# Patient Record
Sex: FEMALE | Race: WHITE | NOT HISPANIC OR LATINO | URBAN - METROPOLITAN AREA
[De-identification: names, ages, dates, MRNs, and addresses within clinical notes are randomized per-mention and may not be internally consistent; named-entity substitution may affect disease eponyms.]

---

## 2023-08-10 ENCOUNTER — HOSPITAL ENCOUNTER (OUTPATIENT)
Dept: DATA CONVERSION | Facility: HOSPITAL | Age: 48
Discharge: HOME | End: 2023-08-10

## 2023-08-10 DIAGNOSIS — R19.5 OTHER FECAL ABNORMALITIES: ICD-10-CM

## 2023-08-14 LAB — CALPROTECTIN STL-MCNT: NORMAL UG/G

## 2023-11-21 ENCOUNTER — LAB (OUTPATIENT)
Dept: LAB | Facility: LAB | Age: 48
End: 2023-11-21
Payer: COMMERCIAL

## 2023-11-21 DIAGNOSIS — R10.84 GENERALIZED ABDOMINAL PAIN: ICD-10-CM

## 2023-11-21 DIAGNOSIS — Z00.00 ENCOUNTER FOR GENERAL ADULT MEDICAL EXAMINATION WITHOUT ABNORMAL FINDINGS: ICD-10-CM

## 2023-11-21 DIAGNOSIS — E55.9 VITAMIN D DEFICIENCY, UNSPECIFIED: Primary | ICD-10-CM

## 2023-11-21 DIAGNOSIS — E03.9 HYPOTHYROIDISM, UNSPECIFIED: ICD-10-CM

## 2023-11-21 LAB
25(OH)D3 SERPL-MCNC: 18 NG/ML (ref 30–100)
ALBUMIN SERPL BCP-MCNC: 4.7 G/DL (ref 3.4–5)
ALP SERPL-CCNC: 116 U/L (ref 33–110)
ALT SERPL W P-5'-P-CCNC: 97 U/L (ref 7–45)
ANION GAP SERPL CALC-SCNC: 12 MMOL/L (ref 10–20)
AST SERPL W P-5'-P-CCNC: 48 U/L (ref 9–39)
BASOPHILS # BLD AUTO: 0.05 X10*3/UL (ref 0–0.1)
BASOPHILS NFR BLD AUTO: 1.1 %
BILIRUB SERPL-MCNC: 0.6 MG/DL (ref 0–1.2)
BUN SERPL-MCNC: 17 MG/DL (ref 6–23)
CALCIUM SERPL-MCNC: 9.7 MG/DL (ref 8.6–10.3)
CHLORIDE SERPL-SCNC: 102 MMOL/L (ref 98–107)
CHOLEST SERPL-MCNC: 188 MG/DL (ref 0–199)
CHOLESTEROL/HDL RATIO: 3.1
CO2 SERPL-SCNC: 29 MMOL/L (ref 21–32)
CREAT SERPL-MCNC: 0.78 MG/DL (ref 0.5–1.05)
CRP SERPL-MCNC: 0.24 MG/DL
EOSINOPHIL # BLD AUTO: 0.16 X10*3/UL (ref 0–0.7)
EOSINOPHIL NFR BLD AUTO: 3.5 %
ERYTHROCYTE [DISTWIDTH] IN BLOOD BY AUTOMATED COUNT: 12.3 % (ref 11.5–14.5)
ERYTHROCYTE [SEDIMENTATION RATE] IN BLOOD BY WESTERGREN METHOD: 10 MM/H (ref 0–20)
GFR SERPL CREATININE-BSD FRML MDRD: >90 ML/MIN/1.73M*2
GLUCOSE SERPL-MCNC: 86 MG/DL (ref 74–99)
HCT VFR BLD AUTO: 42.6 % (ref 36–46)
HDLC SERPL-MCNC: 60.8 MG/DL
HGB BLD-MCNC: 13.4 G/DL (ref 12–16)
IMM GRANULOCYTES # BLD AUTO: 0.01 X10*3/UL (ref 0–0.7)
IMM GRANULOCYTES NFR BLD AUTO: 0.2 % (ref 0–0.9)
LDLC SERPL CALC-MCNC: 112 MG/DL
LYMPHOCYTES # BLD AUTO: 1.55 X10*3/UL (ref 1.2–4.8)
LYMPHOCYTES NFR BLD AUTO: 34 %
MAGNESIUM SERPL-MCNC: 2.26 MG/DL (ref 1.6–2.4)
MCH RBC QN AUTO: 28.9 PG (ref 26–34)
MCHC RBC AUTO-ENTMCNC: 31.5 G/DL (ref 32–36)
MCV RBC AUTO: 92 FL (ref 80–100)
MONOCYTES # BLD AUTO: 0.32 X10*3/UL (ref 0.1–1)
MONOCYTES NFR BLD AUTO: 7 %
NEUTROPHILS # BLD AUTO: 2.47 X10*3/UL (ref 1.2–7.7)
NEUTROPHILS NFR BLD AUTO: 54.2 %
NON HDL CHOLESTEROL: 127 MG/DL (ref 0–149)
NRBC BLD-RTO: 0 /100 WBCS (ref 0–0)
PLATELET # BLD AUTO: 202 X10*3/UL (ref 150–450)
POTASSIUM SERPL-SCNC: 4.4 MMOL/L (ref 3.5–5.3)
PROT SERPL-MCNC: 7.5 G/DL (ref 6.4–8.2)
RBC # BLD AUTO: 4.64 X10*6/UL (ref 4–5.2)
SODIUM SERPL-SCNC: 139 MMOL/L (ref 136–145)
TRIGL SERPL-MCNC: 75 MG/DL (ref 0–149)
TSH SERPL-ACNC: 1.52 MIU/L (ref 0.44–3.98)
VLDL: 15 MG/DL (ref 0–40)
WBC # BLD AUTO: 4.6 X10*3/UL (ref 4.4–11.3)

## 2023-11-21 PROCEDURE — 84443 ASSAY THYROID STIM HORMONE: CPT

## 2023-11-21 PROCEDURE — 36415 COLL VENOUS BLD VENIPUNCTURE: CPT

## 2023-11-21 PROCEDURE — 85652 RBC SED RATE AUTOMATED: CPT

## 2023-11-21 PROCEDURE — 82306 VITAMIN D 25 HYDROXY: CPT

## 2023-11-21 PROCEDURE — 80061 LIPID PANEL: CPT

## 2023-11-21 PROCEDURE — 85025 COMPLETE CBC W/AUTO DIFF WBC: CPT

## 2023-11-21 PROCEDURE — 86140 C-REACTIVE PROTEIN: CPT

## 2023-11-21 PROCEDURE — 83735 ASSAY OF MAGNESIUM: CPT

## 2023-11-21 PROCEDURE — 80053 COMPREHEN METABOLIC PANEL: CPT

## 2023-11-22 ENCOUNTER — OFFICE VISIT (OUTPATIENT)
Dept: OTOLARYNGOLOGY | Facility: CLINIC | Age: 48
End: 2023-11-22
Payer: COMMERCIAL

## 2023-11-22 ENCOUNTER — CLINICAL SUPPORT (OUTPATIENT)
Dept: AUDIOLOGY | Facility: CLINIC | Age: 48
End: 2023-11-22
Payer: COMMERCIAL

## 2023-11-22 VITALS — TEMPERATURE: 97.5 F | BODY MASS INDEX: 25.95 KG/M2 | WEIGHT: 152 LBS | HEIGHT: 64 IN

## 2023-11-22 DIAGNOSIS — H90.3 ASYMMETRICAL SENSORINEURAL HEARING LOSS: ICD-10-CM

## 2023-11-22 DIAGNOSIS — H93.12 TINNITUS OF LEFT EAR: Primary | ICD-10-CM

## 2023-11-22 DIAGNOSIS — H90.3 SENSORINEURAL HEARING LOSS (SNHL) OF BOTH EARS: Primary | ICD-10-CM

## 2023-11-22 PROCEDURE — 92550 TYMPANOMETRY & REFLEX THRESH: CPT | Performed by: AUDIOLOGIST

## 2023-11-22 PROCEDURE — 92557 COMPREHENSIVE HEARING TEST: CPT | Performed by: AUDIOLOGIST

## 2023-11-22 PROCEDURE — 99213 OFFICE O/P EST LOW 20 MIN: CPT | Performed by: OTOLARYNGOLOGY

## 2023-11-22 PROCEDURE — 1036F TOBACCO NON-USER: CPT | Performed by: OTOLARYNGOLOGY

## 2023-11-22 RX ORDER — LEVOTHYROXINE SODIUM 112 UG/1
112 CAPSULE ORAL DAILY
COMMUNITY
End: 2024-03-15 | Stop reason: WASHOUT

## 2023-11-22 NOTE — PROGRESS NOTES
"HPI  Camilo Loo is a 47 y.o. female here to follow-up on left greater than right tinnitus and sensorineural hearing loss.  Negative MRI in 2021.  Audiogram today is stable in comparison to 2021 with left high-frequency asymmetry.  Normal tympanometry.  She had some worsening tinnitus bilaterally recently with COVID but this seems to have resolved.  Nevertheless she kept the appointment for follow-up      Past Medical History:   Diagnosis Date    Personal history of other diseases of the respiratory system 03/28/2016    History of sore throat    Personal history of other endocrine, nutritional and metabolic disease     History of hypothyroidism            Medications:     Current Outpatient Medications:     levothyroxine (Tirosint) 112 mcg capsule, Take 1 capsule (112 mcg) by mouth once daily., Disp: , Rfl:      Allergies:  No Known Allergies     Physical Exam:  Last Recorded Vitals  Temperature 36.4 °C (97.5 °F), height 1.626 m (5' 4\"), weight 68.9 kg (152 lb).  General:     General appearance: Well-developed, well-nourished in no acute distress.       Voice:  normal       Head/face: Normal appearance; nontender to palpation     Facial nerve function: Normal and symmetric bilaterally.    Oral/oropharynx:     Oral vestibule: Normal labial and gingival mucosa     Tongue/floor of mouth: Normal without lesion     Oropharynx: Clear.  No lesions present of the hard/soft palate, posterior pharynx    Neck:     Neck: Normal appearance, trachea midline     Salivary glands: Normal to palpation bilaterally     Lymph nodes: No cervical lymphadenopathy to palpation     Thyroid: No thyromegaly.  No palpable nodules     Range of motion: Normal    Neurological:     Cortical functions: Alert and oriented x3, appropriate affect       Larynx/hypopharynx:     Laryngeal findings: Mirror exam inadequate or limited secondary to enlarged base of tongue and/or excessive gagging    Ear:     Ear canal: Normal bilaterally     Tympanic " membrane: Intact and mobile bilaterally     Pinna: Normal bilaterally     Hearing:  Gross hearing assessment normal by voice    Nose:     Visualized using: Anterior rhinoscopy     Nasopharynx: Inadequate mirror exam secondary to gag, anatomy.       Nasal dorsum: Nontraumatic midline appearance     Septum: Midline     Inferior turbinates: Normally sized     Mucosa: Bilateral, pink, normal appearing       Assessment/Plan   Reassured no change in audiogram.  She is feeling better again.  Back to her baseline left tinnitus.  Tinnitus protocol reviewed.  Masking reviewed.  Her mental health is good.  We will recheck the audiogram in a couple years, sooner as needed with change         Lalito Wright MD

## 2023-11-30 ENCOUNTER — HOSPITAL ENCOUNTER (OUTPATIENT)
Dept: RADIOLOGY | Facility: HOSPITAL | Age: 48
Discharge: HOME | End: 2023-11-30
Payer: COMMERCIAL

## 2023-11-30 DIAGNOSIS — R79.89 OTHER SPECIFIED ABNORMAL FINDINGS OF BLOOD CHEMISTRY: ICD-10-CM

## 2023-11-30 DIAGNOSIS — R10.2 PELVIC AND PERINEAL PAIN: ICD-10-CM

## 2023-11-30 PROCEDURE — 76700 US EXAM ABDOM COMPLETE: CPT

## 2023-11-30 PROCEDURE — 76830 TRANSVAGINAL US NON-OB: CPT

## 2023-12-26 ENCOUNTER — HOSPITAL ENCOUNTER (OUTPATIENT)
Dept: RADIOLOGY | Facility: HOSPITAL | Age: 48
Discharge: HOME | End: 2023-12-26
Payer: COMMERCIAL

## 2023-12-26 DIAGNOSIS — Z13.6 ENCOUNTER FOR SCREENING FOR CARDIOVASCULAR DISORDERS: ICD-10-CM

## 2023-12-26 PROCEDURE — 75571 CT HRT W/O DYE W/CA TEST: CPT

## 2024-01-11 ENCOUNTER — HOSPITAL ENCOUNTER (OUTPATIENT)
Dept: RADIOLOGY | Facility: HOSPITAL | Age: 49
Discharge: HOME | End: 2024-01-11
Payer: COMMERCIAL

## 2024-01-11 DIAGNOSIS — M54.50 LOW BACK PAIN, UNSPECIFIED: ICD-10-CM

## 2024-01-11 DIAGNOSIS — G89.29 OTHER CHRONIC PAIN: ICD-10-CM

## 2024-01-11 DIAGNOSIS — R10.84 GENERALIZED ABDOMINAL PAIN: ICD-10-CM

## 2024-01-11 DIAGNOSIS — R10.9 UNSPECIFIED ABDOMINAL PAIN: ICD-10-CM

## 2024-01-11 PROCEDURE — 2550000001 HC RX 255 CONTRASTS

## 2024-01-11 PROCEDURE — 74177 CT ABD & PELVIS W/CONTRAST: CPT

## 2024-01-11 RX ADMIN — IOHEXOL 75 ML: 350 INJECTION, SOLUTION INTRAVENOUS at 09:14

## 2024-01-18 ENCOUNTER — LAB (OUTPATIENT)
Dept: LAB | Facility: LAB | Age: 49
End: 2024-01-18
Payer: COMMERCIAL

## 2024-01-18 DIAGNOSIS — M54.6 PAIN IN THORACIC SPINE: Primary | ICD-10-CM

## 2024-01-18 LAB — HOLD SPECIMEN: NORMAL

## 2024-01-18 PROCEDURE — 36415 COLL VENOUS BLD VENIPUNCTURE: CPT

## 2024-01-18 PROCEDURE — 86235 NUCLEAR ANTIGEN ANTIBODY: CPT

## 2024-01-18 PROCEDURE — 86225 DNA ANTIBODY NATIVE: CPT

## 2024-01-18 PROCEDURE — 86038 ANTINUCLEAR ANTIBODIES: CPT

## 2024-01-19 LAB
ANA PATTERN: ABNORMAL
ANA SER QL HEP2 SUBST: POSITIVE
ANA TITR SER IF: ABNORMAL {TITER}
CENTROMERE B AB SER-ACNC: <0.2 AI
CHROMATIN AB SERPL-ACNC: <0.2 AI
DSDNA AB SER-ACNC: <1 IU/ML
ENA JO1 AB SER QL IA: <0.2 AI
ENA RNP AB SER IA-ACNC: 2.2 AI
ENA SCL70 AB SER QL IA: <0.2 AI
ENA SM AB SER IA-ACNC: <0.2 AI
ENA SM+RNP AB SER QL IA: <0.2 AI
ENA SS-A AB SER IA-ACNC: <0.2 AI
ENA SS-B AB SER IA-ACNC: <0.2 AI
RIBOSOMAL P AB SER-ACNC: <0.2 AI

## 2024-02-21 ENCOUNTER — HOSPITAL ENCOUNTER (OUTPATIENT)
Dept: RADIOLOGY | Facility: HOSPITAL | Age: 49
Discharge: HOME | End: 2024-02-21
Payer: COMMERCIAL

## 2024-02-21 DIAGNOSIS — M54.6 PAIN IN THORACIC SPINE: ICD-10-CM

## 2024-02-21 DIAGNOSIS — G89.29 OTHER CHRONIC PAIN: ICD-10-CM

## 2024-02-21 PROCEDURE — 72128 CT CHEST SPINE W/O DYE: CPT

## 2024-02-27 ENCOUNTER — APPOINTMENT (OUTPATIENT)
Dept: RADIOLOGY | Facility: HOSPITAL | Age: 49
End: 2024-02-27
Payer: COMMERCIAL

## 2024-03-09 ENCOUNTER — LAB (OUTPATIENT)
Dept: LAB | Facility: LAB | Age: 49
End: 2024-03-09
Payer: COMMERCIAL

## 2024-03-09 DIAGNOSIS — I10 ESSENTIAL (PRIMARY) HYPERTENSION: Primary | ICD-10-CM

## 2024-03-09 LAB
ALBUMIN SERPL-MCNC: 4.4 G/DL (ref 3.5–5)
ALP BLD-CCNC: 131 U/L (ref 35–125)
ALT SERPL-CCNC: 98 U/L (ref 5–40)
ANION GAP SERPL CALC-SCNC: 11 MMOL/L
AST SERPL-CCNC: 53 U/L (ref 5–40)
BILIRUB SERPL-MCNC: 0.5 MG/DL (ref 0.1–1.2)
BUN SERPL-MCNC: 15 MG/DL (ref 8–25)
CALCIUM SERPL-MCNC: 9.4 MG/DL (ref 8.5–10.4)
CHLORIDE SERPL-SCNC: 103 MMOL/L (ref 97–107)
CO2 SERPL-SCNC: 28 MMOL/L (ref 24–31)
CREAT SERPL-MCNC: 0.9 MG/DL (ref 0.4–1.6)
EGFRCR SERPLBLD CKD-EPI 2021: 79 ML/MIN/1.73M*2
GLUCOSE SERPL-MCNC: 90 MG/DL (ref 65–99)
POTASSIUM SERPL-SCNC: 4.8 MMOL/L (ref 3.4–5.1)
PROT SERPL-MCNC: 7.1 G/DL (ref 5.9–7.9)
SODIUM SERPL-SCNC: 142 MMOL/L (ref 133–145)

## 2024-03-09 PROCEDURE — 80053 COMPREHEN METABOLIC PANEL: CPT

## 2024-03-09 PROCEDURE — 36415 COLL VENOUS BLD VENIPUNCTURE: CPT

## 2024-03-11 ENCOUNTER — HOSPITAL ENCOUNTER (OUTPATIENT)
Dept: RADIOLOGY | Facility: HOSPITAL | Age: 49
Discharge: HOME | End: 2024-03-11
Payer: COMMERCIAL

## 2024-03-11 DIAGNOSIS — M54.50 LOW BACK PAIN, UNSPECIFIED: ICD-10-CM

## 2024-03-11 DIAGNOSIS — M54.9 DORSALGIA, UNSPECIFIED: ICD-10-CM

## 2024-03-11 DIAGNOSIS — M54.6 PAIN IN THORACIC SPINE: ICD-10-CM

## 2024-03-11 PROCEDURE — 72141 MRI NECK SPINE W/O DYE: CPT | Performed by: RADIOLOGY

## 2024-03-11 PROCEDURE — 72146 MRI CHEST SPINE W/O DYE: CPT

## 2024-03-11 PROCEDURE — 72146 MRI CHEST SPINE W/O DYE: CPT | Performed by: RADIOLOGY

## 2024-03-11 PROCEDURE — 72141 MRI NECK SPINE W/O DYE: CPT

## 2024-03-11 PROCEDURE — 72148 MRI LUMBAR SPINE W/O DYE: CPT

## 2024-03-11 PROCEDURE — 72148 MRI LUMBAR SPINE W/O DYE: CPT | Performed by: RADIOLOGY

## 2024-03-14 NOTE — PROGRESS NOTES
Hepatology: Initial Office Note     Patient: Camilo Loo, a 48 y.o. year old female presents for evaluation of elevated liver enzymes.  PCP: Cm Rice MD    History of Present Illness   Camilo Loo presents for evaluation of elevated liver enzymes.    Noted to have elevation in liver enzymes for the last 3-4 years. More recently in Nov 2023 was noted to have worsening liver enzymes. Denies prior diagnosis of chronic liver disease or cirrhosis.    Denies symptoms of right upper quadrant abdominal pain, nausea, vomiting, jaundice, abdominal distention, confusion.   Denies fever, chills, rash, altered bowel habits.     She has been dealing with chronic back pain, joint pain and neck pain. Was noted to have a positive LAURENCE test and is planned to see rheumatology this month. Denies use of herbal supplements. Started taking meloxicam 2-3 weeks ago, once a day.     Notes use of 1-2 drinks of whiteclaw while making dinner in the past, has abstained from alcohol use (had a glass of wine over the holidays) for 3 months.      Review of Systems   Constitutional/ General: No fever, no night sweats, no significant changes in weight   Eyes: no yellow discoloration  ENT: normal   Cardiovascular: no chest pain, no palpitations  Respiratory: no shortness of breath  Gastrointestinal: denies abdominal pain, nausea, vomiting  Integumentary: no rashes, no yellow discoloration of skin  Neurologic: no weakness or numbness of extremities  Psychiatric: no mood fluctuations  Musculoskeletal: back pain  Genitourinary: no dysuria, no hematuria    All other systems have been reviewed and are negative except as noted in the HPI and above.    PMHx: Hypothyroidism (since the age of 11 yrs, no dose change), HTN, chronic back pain, Vit D deficiency  PSHx: Hysterectomy  Social hx: 1-2 drinks of alcohol on a regular basis up until 3 months ago, prior hx of smoking, denies hx of illicit substance use.   Family hx: denies history of  hepatobiliary disease or malignancy in the family. Maternal aunt with hx of hypothyroidism. Maternal uncle with hx of elevated homocysteine levels     Medications     Current Outpatient Medications   Medication Instructions    levothyroxine (TIROSINT) 112 mcg, oral, Daily         Physical Examination     Vitals:    03/15/24 1511   BP: 144/81   Pulse: 69   Resp: 18   SpO2: 98%     Vitals:    03/15/24 1511   Weight: 68 kg (149 lb 14.4 oz)     Body mass index is 26.98 kg/m².    Constitutional: awake, alert   Eyes: EOMI, anicteric sclera  ENT: no oropharyngeal lesions visualized  Respiratory: Bilateral air entry equal no wheezing  Cardiovascular: regular rate and rhythm, no lower extremity edema  Abdomen: soft, non tender, non distended, bowel sounds present  Integumentary: no wounds on examined skin   Musculoskeletal: no joint swelling   Neurologic: gross motor strength intact   Psychiatric: alert, appropriate mood and affect, oriented to time/place/person    Labs     Lab Results   Component Value Date     03/09/2024    K 4.8 03/09/2024    CREATININE 0.90 03/09/2024    ALBUMIN 4.4 03/09/2024    ALT 98 (H) 03/09/2024    AST 53 (H) 03/09/2024    ALKPHOS 131 (H) 03/09/2024    HGB 13.4 11/21/2023     11/21/2023      Lab Results   Component Value Date    ALT 98 (H) 03/09/2024    ALT 97 (H) 11/21/2023    ALT 42 (H) 02/10/2023    ALT 22 02/03/2023    ALT 83 (H) 11/19/2021    AST 53 (H) 03/09/2024    AST 48 (H) 11/21/2023    AST 16 02/10/2023    AST 20 02/03/2023    AST 45 (H) 11/19/2021    ALKPHOS 131 (H) 03/09/2024    ALKPHOS 116 (H) 11/21/2023    ALKPHOS 90 02/10/2023    ALKPHOS 84 02/03/2023    ALKPHOS 108 11/19/2021    BILITOT 0.5 03/09/2024    LAURENCE Positive (A) 01/18/2024    ANATITER 1:80 01/18/2024     Imaging   CTAP Jan 2024: Liver is unremarkable.   US abdomen Nov 2023: FINDINGS: Liver: Normal echogenicity without focal lesion. Gallbladder: Normal.  No gallstones, wall thickening, or pericholecystic fluid.  Biliary tree: There is no intrahepatic biliary distention. CBD measures 1.8 - 2.4 mm diameter. No choledocholithiasis.    Assessment and Plan    Camilo Loo, a 48 y.o. year old female presents for evaluation of elevated liver enzymes. I have reviewed pertinent provider notes, labs and imaging studies. Discussed results and their interpretation with the patient today.    Encounter Diagnosis   Name Primary?    Elevated liver enzymes Yes     Orders Placed This Encounter   Procedures    Alpha-1 Antitrypsin Phenotype    LAURENCE with Reflex to ERIBERTO    Anti-Mitochondrial Antibody    Anti-Smooth Muscle Antibody    Ceruloplasmin    Ferritin    Hepatic Function Panel    Hepatitis A Antibody, Total    Hepatitis B Core Antibody, Total    Hepatitis B Surface Antibody    Hepatitis B Surface Antigen    Hepatitis C Antibody    Immunoglobulins (IgG, IgA, IgM)    Iron and TIBC    Liver/Kidney Microsome Type 1 Antibodies, Serum    Soluble Liver Ag Abs    Tissue Transglutaminase IgA    CBC and Auto Differential    Protime-INR      # Chronic elevations of ALT/AST, mixed pattern of liver enzyme elevations in the past year  Worsening ALT/AST elevations in the last 3-4 months. Noted to have an elevated LAURENCE of 1:80 titer. Noted to have regular mild to modest alcohol use- but LFT elevations are not typical for alcohol related injury of the liver. No steatosis noted on imaging.     Recommend further investigation for etiologies of chronic liver ds which can result in elevated LFTs. This includes evaluation for chronic hepatitis B, C; autoimmune hepatitis, A1AT deficiency, screening for other genetic and metabolic ds of the liver. In addition, recommend check of hepatitis A and B serologies to determine prior exposure or immunity. Discussed work up for elevated liver enzymes with the pt. Pending labs work up, will review further recommendations including need for a liver bx. Avoidance of hepatotoxic agents, including alcohol and NSAIDs.      Follow up visit in 3 months.

## 2024-03-15 ENCOUNTER — OFFICE VISIT (OUTPATIENT)
Dept: GASTROENTEROLOGY | Facility: CLINIC | Age: 49
End: 2024-03-15
Payer: COMMERCIAL

## 2024-03-15 VITALS
HEART RATE: 69 BPM | RESPIRATION RATE: 18 BRPM | BODY MASS INDEX: 26.56 KG/M2 | SYSTOLIC BLOOD PRESSURE: 144 MMHG | DIASTOLIC BLOOD PRESSURE: 81 MMHG | HEIGHT: 63 IN | OXYGEN SATURATION: 98 % | WEIGHT: 149.9 LBS

## 2024-03-15 DIAGNOSIS — R74.8 ELEVATED LIVER ENZYMES: Primary | ICD-10-CM

## 2024-03-15 PROCEDURE — 1036F TOBACCO NON-USER: CPT | Performed by: STUDENT IN AN ORGANIZED HEALTH CARE EDUCATION/TRAINING PROGRAM

## 2024-03-15 PROCEDURE — 99204 OFFICE O/P NEW MOD 45 MIN: CPT | Performed by: STUDENT IN AN ORGANIZED HEALTH CARE EDUCATION/TRAINING PROGRAM

## 2024-03-15 RX ORDER — LEVOTHYROXINE SODIUM 112 UG/1
TABLET ORAL
COMMUNITY

## 2024-03-15 RX ORDER — MELOXICAM 15 MG/1
15 TABLET ORAL
COMMUNITY
Start: 2024-02-22 | End: 2024-03-23

## 2024-03-15 RX ORDER — DULOXETIN HYDROCHLORIDE 30 MG/1
30 CAPSULE, DELAYED RELEASE ORAL
COMMUNITY

## 2024-03-15 RX ORDER — METRONIDAZOLE 7.5 MG/G
CREAM TOPICAL
COMMUNITY
Start: 2023-04-17

## 2024-03-15 ASSESSMENT — PAIN SCALES - GENERAL: PAINLEVEL: 4

## 2024-03-15 NOTE — PATIENT INSTRUCTIONS
Camilo Loo,     Thank you for coming in for your hepatology office visit today. As per our discussion, I recommend:     Have labs done for evaluation of the liver.   Continue to avoid alcohol use. Avoid regular use of NSAIDs.     I would like to see you back in the office in 3 months.   If you are not provided with a date for your follow up visit at the end of your encounter today at the check out desk, I would encourage you to call [phone] to schedule your appointment with me.   If you have any trouble or need assistance with having this done, please reach out to my 's office at [phone] (Ms Amisha Gold) or my nurse coordinator at [phone] (Ms Didi ANDERSON).     I look forward to seeing you again. Thank you for allowing me to participate in your care.     Sincerely,  Rachelle Rdz MD  Hepatology

## 2024-03-25 ENCOUNTER — OFFICE VISIT (OUTPATIENT)
Dept: RHEUMATOLOGY | Facility: CLINIC | Age: 49
End: 2024-03-25
Payer: COMMERCIAL

## 2024-03-25 VITALS — WEIGHT: 149 LBS | BODY MASS INDEX: 26.82 KG/M2 | SYSTOLIC BLOOD PRESSURE: 120 MMHG | DIASTOLIC BLOOD PRESSURE: 72 MMHG

## 2024-03-25 DIAGNOSIS — R74.8 ELEVATED LIVER ENZYMES: ICD-10-CM

## 2024-03-25 DIAGNOSIS — R76.8 ANA POSITIVE: Primary | ICD-10-CM

## 2024-03-25 DIAGNOSIS — G89.29 OTHER CHRONIC PAIN: ICD-10-CM

## 2024-03-25 PROCEDURE — 99215 OFFICE O/P EST HI 40 MIN: CPT | Performed by: INTERNAL MEDICINE

## 2024-03-25 PROCEDURE — 1036F TOBACCO NON-USER: CPT | Performed by: INTERNAL MEDICINE

## 2024-03-25 NOTE — PROGRESS NOTES
"Ref per Dr. Rdz for evaluation and treatment of + LAURENCE.  Per patient labs checked due to elevated liver function tests.  CT scan x 3 with normal results.  Recommend eval with Rheum for possible causes.    Per patient shows infection and inflammation.  \" Dr. Harper tells me where the infections are. \"   MRI spine with buldging discs as well as ? Mass effect thoracic spine.  Eval with Pain Management with recommendation of Duloxetine daily.  Pending nerve and muscle testing.       She is here with +LAURENCE.  She had stomach pain last yr, and she also has back pain.  She said that she had mult CT scans that showed \"infection\" - although the most recent CT scans have been nl.  She states that her primary told her that this might be autoimmune - but again, I don't see an LAURENCE from last yr.  She has LBP 1-2 yrs, lower right back pain started 1 yr ago, and she thought she had a UTI, so her gyn had her take abx and then saw her in person who \"thought she felt something\" so her primary had her get the CT.  She had CP and abd pain and couldn't eat because it felt like the food was \"pushing on out\" so she went to ER and had CT that showed \"infection\" and put her on abx.  She saw GI - no note I can find, and they put her on \"something like a pepcid\"  She had \"massive muscle aches\" which she has had for 1 yr.  \"My thoracic pain comes and goes\" \"it's evolved into a contant feeling like there is something there\"  She has \"always had a problem with it for 2 or 3 yrs where I have a problem turning it left or right\"  She feels like she has swollen glands, and she has to hold her neck because it is so painful.  She occ feels like she can't get food to go down.  Pain mgmt told her that this could be d/t c-spine issues\"  She has tinnitus in her L ear x 3 yrs and has seen ENT who did MRI that was unremarkable.  She gets pain in her ears, and her head feels \"clogged all the time\" \"I get dizzy, I feel unfocused, I feel exhausted all the time\"  " "She has ST at the base of her neck.  \"I don't know if I'm having flare ups in different parts of my body\"  She was started on duloxetine x 1.5 wks, and she has noticed the pain is improving.  She had covid 9/23 and 12/23.    Her L knee, L leg, and L arm hurt.   No swelling.  She feels worst in AM.   She has L arm numbness on and off for \"a couple years\" - has never seen neuro.   She has HA \"my whole life\" \"pretty routinely\" - she hasn't seen neuro about this.   No fever.  +sicca - \"I would say yeah - my eyes have been itchy a few wks\"  No mouth sores or raynauds.  She gets intermittent rashes on and off x 2 yrs - itchy and can last a few months - saw derm \"it's been awhile\" Said most recently she saw an allergist who gave her a cream, which helped.   She has been dx with rosacea on her nose.   No recent CP.  No resp.  Has alt diarrhea/constipation    FH - +arthritis.  No CTD  SH - former smoker.  No recent EtOH - did drink 2 a few x/wk prior.   No drugs    PE  Gen - NAD  HEENT - moist MM nl salivary pooling (chewing gum)  Neck - supple, no LAD.  I do not feel any swollen LN  CV - RRR no r/m/g  Lungs - CTA  Abd - +BS  Extr - 2+ DP, PT, and rad pulses.  No c/c/e  Skin - no rash  Psych - anxious affect  Neuro - nl strength.  Reflexes 2+ symmetric  Msk - no synovitis    A/P - Pt with chronic back pain x 2 yrs, abd pain last yr, diffuse pain, L arm numbness, and other somatic complaints - here with +LAURENCE  LAURENCE 1:80/RNP 2.2.  prev LAURENCE 1:80.  RF, CBC, Cr, CRP, ESR, and TSH nl.  Chronic incr liver enzymes.  Is being worked up by hepatol  She is seeing pain mgmt and is to have EMG  The significance of the +LAURENCE is unclear.  She may have a low-level UCTD  Covid-induced +LAURENCE is in ddx  She is starting to improve on duloxetine  Avoid NSAIDs with incr liver enzymes  Check further labs  Consider trial of hcq  Reeval 3 mo or sooner PRN  "

## 2024-03-28 ENCOUNTER — LAB (OUTPATIENT)
Dept: LAB | Facility: LAB | Age: 49
End: 2024-03-28
Payer: COMMERCIAL

## 2024-03-28 DIAGNOSIS — R74.8 ELEVATED LIVER ENZYMES: ICD-10-CM

## 2024-03-28 DIAGNOSIS — R76.8 ANA POSITIVE: ICD-10-CM

## 2024-03-28 LAB
ALBUMIN SERPL-MCNC: 4.7 G/DL (ref 3.5–5)
ALP BLD-CCNC: 124 U/L (ref 35–125)
ALT SERPL-CCNC: 76 U/L (ref 5–40)
APPEARANCE UR: CLEAR
AST SERPL-CCNC: 30 U/L (ref 5–40)
BASOPHILS # BLD AUTO: 0.06 X10*3/UL (ref 0–0.1)
BASOPHILS NFR BLD AUTO: 1 %
BILIRUB DIRECT SERPL-MCNC: <0.2 MG/DL (ref 0–0.2)
BILIRUB SERPL-MCNC: 0.4 MG/DL (ref 0.1–1.2)
BILIRUB UR STRIP.AUTO-MCNC: NEGATIVE MG/DL
C4 SERPL-MCNC: 32 MG/DL (ref 10–50)
CERULOPLASMIN SERPL-MCNC: 28.1 MG/DL (ref 20–60)
COLOR UR: ABNORMAL
EOSINOPHIL # BLD AUTO: 0.15 X10*3/UL (ref 0–0.7)
EOSINOPHIL NFR BLD AUTO: 2.6 %
ERYTHROCYTE [DISTWIDTH] IN BLOOD BY AUTOMATED COUNT: 11.9 % (ref 11.5–14.5)
FERRITIN SERPL-MCNC: 109 NG/ML (ref 13–150)
GLUCOSE UR STRIP.AUTO-MCNC: NORMAL MG/DL
HAV AB SER QL IA: NONREACTIVE
HBV CORE AB SER QL: NONREACTIVE
HBV SURFACE AB SER-ACNC: <3.1 MIU/ML
HBV SURFACE AG SERPL QL IA: NONREACTIVE
HCT VFR BLD AUTO: 42.7 % (ref 36–46)
HCV AB SER QL: NONREACTIVE
HGB BLD-MCNC: 13.4 G/DL (ref 12–16)
IGA SERPL-MCNC: 297 MG/DL (ref 70–400)
IGG SERPL-MCNC: 1350 MG/DL (ref 700–1600)
IGM SERPL-MCNC: 81 MG/DL (ref 40–230)
IMM GRANULOCYTES # BLD AUTO: 0.02 X10*3/UL (ref 0–0.7)
IMM GRANULOCYTES NFR BLD AUTO: 0.3 % (ref 0–0.9)
INR PPP: 1 (ref 0.9–1.2)
IRON SATN MFR SERPL: 39 % (ref 12–50)
IRON SERPL-MCNC: 109 UG/DL (ref 30–160)
KETONES UR STRIP.AUTO-MCNC: NEGATIVE MG/DL
LEUKOCYTE ESTERASE UR QL STRIP.AUTO: ABNORMAL
LYMPHOCYTES # BLD AUTO: 1.81 X10*3/UL (ref 1.2–4.8)
LYMPHOCYTES NFR BLD AUTO: 31.1 %
MCH RBC QN AUTO: 28.3 PG (ref 26–34)
MCHC RBC AUTO-ENTMCNC: 31.4 G/DL (ref 32–36)
MCV RBC AUTO: 90 FL (ref 80–100)
MONOCYTES # BLD AUTO: 0.37 X10*3/UL (ref 0.1–1)
MONOCYTES NFR BLD AUTO: 6.4 %
NEUTROPHILS # BLD AUTO: 3.41 X10*3/UL (ref 1.2–7.7)
NEUTROPHILS NFR BLD AUTO: 58.6 %
NITRITE UR QL STRIP.AUTO: NEGATIVE
NRBC BLD-RTO: 0 /100 WBCS (ref 0–0)
PH UR STRIP.AUTO: 5.5 [PH]
PLATELET # BLD AUTO: 191 X10*3/UL (ref 150–450)
PROT SERPL-MCNC: 7.5 G/DL (ref 5.9–7.9)
PROT UR STRIP.AUTO-MCNC: NEGATIVE MG/DL
PROTHROMBIN TIME: 10.3 SECONDS (ref 9.3–12.7)
RBC # BLD AUTO: 4.73 X10*6/UL (ref 4–5.2)
RBC # UR STRIP.AUTO: NEGATIVE /UL
RBC #/AREA URNS AUTO: ABNORMAL /HPF
SP GR UR STRIP.AUTO: 1.02
SQUAMOUS #/AREA URNS AUTO: ABNORMAL /HPF
THYROPEROXIDASE AB SERPL-ACNC: 61 IU/ML
TIBC SERPL-MCNC: 280 UG/DL (ref 228–428)
TTG IGA SER IA-ACNC: <1 U/ML
UIBC SERPL-MCNC: 171 UG/DL (ref 110–370)
UROBILINOGEN UR STRIP.AUTO-MCNC: NORMAL MG/DL
WBC # BLD AUTO: 5.8 X10*3/UL (ref 4.4–11.3)
WBC #/AREA URNS AUTO: ABNORMAL /HPF

## 2024-03-28 PROCEDURE — 86376 MICROSOMAL ANTIBODY EACH: CPT

## 2024-03-28 PROCEDURE — 81001 URINALYSIS AUTO W/SCOPE: CPT

## 2024-03-28 PROCEDURE — 83540 ASSAY OF IRON: CPT

## 2024-03-28 PROCEDURE — 87340 HEPATITIS B SURFACE AG IA: CPT

## 2024-03-28 PROCEDURE — 86160 COMPLEMENT ANTIGEN: CPT

## 2024-03-28 PROCEDURE — 83516 IMMUNOASSAY NONANTIBODY: CPT

## 2024-03-28 PROCEDURE — 86704 HEP B CORE ANTIBODY TOTAL: CPT

## 2024-03-28 PROCEDURE — 86038 ANTINUCLEAR ANTIBODIES: CPT

## 2024-03-28 PROCEDURE — 82728 ASSAY OF FERRITIN: CPT

## 2024-03-28 PROCEDURE — 86708 HEPATITIS A ANTIBODY: CPT

## 2024-03-28 PROCEDURE — 82104 ALPHA-1-ANTITRYPSIN PHENO: CPT

## 2024-03-28 PROCEDURE — 83550 IRON BINDING TEST: CPT

## 2024-03-28 PROCEDURE — 86706 HEP B SURFACE ANTIBODY: CPT

## 2024-03-28 PROCEDURE — 86800 THYROGLOBULIN ANTIBODY: CPT

## 2024-03-28 PROCEDURE — 85025 COMPLETE CBC W/AUTO DIFF WBC: CPT

## 2024-03-28 PROCEDURE — 86381 MITOCHONDRIAL ANTIBODY EACH: CPT

## 2024-03-28 PROCEDURE — 86015 ACTIN ANTIBODY EACH: CPT

## 2024-03-28 PROCEDURE — 82784 ASSAY IGA/IGD/IGG/IGM EACH: CPT

## 2024-03-28 PROCEDURE — 85610 PROTHROMBIN TIME: CPT

## 2024-03-28 PROCEDURE — 82390 ASSAY OF CERULOPLASMIN: CPT

## 2024-03-28 PROCEDURE — 86225 DNA ANTIBODY NATIVE: CPT

## 2024-03-28 PROCEDURE — 36415 COLL VENOUS BLD VENIPUNCTURE: CPT

## 2024-03-28 PROCEDURE — 86235 NUCLEAR ANTIGEN ANTIBODY: CPT

## 2024-03-28 PROCEDURE — 86803 HEPATITIS C AB TEST: CPT

## 2024-03-28 PROCEDURE — 80076 HEPATIC FUNCTION PANEL: CPT

## 2024-03-29 LAB
ANA PATTERN: ABNORMAL
ANA SER QL HEP2 SUBST: POSITIVE
ANA TITR SER IF: ABNORMAL {TITER}
C3 SERPL-MCNC: 161 MG/DL (ref 87–200)
CENTROMERE B AB SER-ACNC: <0.2 AI
CHROMATIN AB SERPL-ACNC: <0.2 AI
DSDNA AB SER-ACNC: <1 IU/ML
ENA JO1 AB SER QL IA: <0.2 AI
ENA RNP AB SER IA-ACNC: 2.4 AI
ENA SCL70 AB SER QL IA: <0.2 AI
ENA SM AB SER IA-ACNC: <0.2 AI
ENA SM+RNP AB SER QL IA: <0.2 AI
ENA SS-A AB SER IA-ACNC: <0.2 AI
ENA SS-B AB SER IA-ACNC: <0.2 AI
MITOCHONDRIA AB SER QL IF: NEGATIVE
RIBOSOMAL P AB SER-ACNC: <0.2 AI
SMOOTH MUSCLE AB SER QL IF: NEGATIVE

## 2024-03-30 LAB
LKM AB TITR SER IF: NORMAL {TITER}
THYROGLOB AB SERPL-ACNC: <0.9 IU/ML (ref 0–4)

## 2024-03-31 LAB — SOLUBLE LIVER IGG SER IA-ACNC: 2.9 U (ref 0–24.9)

## 2024-04-01 LAB
A1AT PHENOTYP SERPL-IMP: NORMAL
A1AT SERPL-MCNC: 133 MG/DL (ref 90–200)

## 2024-04-11 DIAGNOSIS — R74.8 ELEVATED LIVER ENZYMES: ICD-10-CM

## 2024-05-15 ENCOUNTER — CLINICAL SUPPORT (OUTPATIENT)
Dept: AUDIOLOGY | Facility: CLINIC | Age: 49
End: 2024-05-15
Payer: COMMERCIAL

## 2024-05-15 ENCOUNTER — OFFICE VISIT (OUTPATIENT)
Dept: OTOLARYNGOLOGY | Facility: CLINIC | Age: 49
End: 2024-05-15
Payer: COMMERCIAL

## 2024-05-15 VITALS — BODY MASS INDEX: 27.82 KG/M2 | WEIGHT: 157 LBS | HEIGHT: 63 IN

## 2024-05-15 DIAGNOSIS — H90.3 SENSORINEURAL HEARING LOSS (SNHL) OF BOTH EARS: ICD-10-CM

## 2024-05-15 DIAGNOSIS — H93.12 TINNITUS OF LEFT EAR: ICD-10-CM

## 2024-05-15 DIAGNOSIS — H93.12 TINNITUS OF LEFT EAR: Primary | ICD-10-CM

## 2024-05-15 DIAGNOSIS — K21.9 LARYNGOPHARYNGEAL REFLUX: ICD-10-CM

## 2024-05-15 DIAGNOSIS — H90.3 ASYMMETRICAL SENSORINEURAL HEARING LOSS: Primary | ICD-10-CM

## 2024-05-15 DIAGNOSIS — J30.9 ALLERGIC RHINITIS, UNSPECIFIED SEASONALITY, UNSPECIFIED TRIGGER: ICD-10-CM

## 2024-05-15 PROCEDURE — 92550 TYMPANOMETRY & REFLEX THRESH: CPT | Performed by: AUDIOLOGIST

## 2024-05-15 PROCEDURE — 92557 COMPREHENSIVE HEARING TEST: CPT | Performed by: AUDIOLOGIST

## 2024-05-15 PROCEDURE — 1036F TOBACCO NON-USER: CPT | Performed by: OTOLARYNGOLOGY

## 2024-05-15 PROCEDURE — 1036F TOBACCO NON-USER: CPT | Performed by: AUDIOLOGIST

## 2024-05-15 PROCEDURE — 99213 OFFICE O/P EST LOW 20 MIN: CPT | Performed by: OTOLARYNGOLOGY

## 2024-05-15 PROCEDURE — 31575 DIAGNOSTIC LARYNGOSCOPY: CPT | Performed by: OTOLARYNGOLOGY

## 2024-05-15 NOTE — PROGRESS NOTES
HPI  Camilo Loo is a 48 y.o. female with a working diagnosis of a mixed connective tissue disorder causing considerable amounts of extremity discomfort.  She has had her spine evaluated and is on duloxetine which seems to be helping to some degree with these problems.  She is here today with some other symptoms and concerned that they may or may not be related to the above.  She is having some pressure in her ears bilaterally.  Has history of allergies.  Bothered by throat discomfort.  No dysphagia or unintentional weight loss.  No hemoptysis.  She is having some soreness of the neck.  She is not on any medications for allergies.  He does not take anything for acid reflux.  Audiogram today is stable.  The left is very slightly asymmetric in comparison to the right at the highest frequencies.  It looks about like it did in November, a touch better in fact.    Prior hx: here to follow-up on left greater than right tinnitus and sensorineural hearing loss.  Negative MRI in 2021.  Audiogram today is stable in comparison to 2021 with left high-frequency asymmetry.  Normal tympanometry.  She had some worsening tinnitus bilaterally recently with COVID but this seems to have resolved.  Nevertheless she kept the appointment for follow-up      Past Medical History:   Diagnosis Date    Personal history of other diseases of the respiratory system 03/28/2016    History of sore throat    Personal history of other endocrine, nutritional and metabolic disease     History of hypothyroidism            Medications:     Current Outpatient Medications:     DULoxetine (Cymbalta) 30 mg DR capsule, Take 1 capsule (30 mg) by mouth once daily., Disp: , Rfl:     levothyroxine (Synthroid, Levoxyl) 112 mcg tablet, TAKE 1 TABLET BY MOUTH MON-SAT, TAKE 1.5 TABLET ON SUNDAYS, Disp: , Rfl:     metroNIDAZOLE (Metrocream) 0.75 % cream, APPLY CREAM TO AFFECTED AREAS ON FACE TWICE DAILY., Disp: , Rfl:     multivit-minerals/folic acid (ADULT ONE DAILY  "MULTIVITAMIN ORAL), once every 24 hours., Disp: , Rfl:      Allergies:  No Known Allergies     Physical Exam:  Last Recorded Vitals  Height 1.588 m (5' 2.5\"), weight 71.2 kg (157 lb).  General:     General appearance: Well-developed, well-nourished in no acute distress.       Voice:  normal       Head/face: Normal appearance; nontender to palpation     Facial nerve function: Normal and symmetric bilaterally.    Oral/oropharynx:     Oral vestibule: Normal labial and gingival mucosa     Tongue/floor of mouth: Normal without lesion     Oropharynx: Clear.  No lesions present of the hard/soft palate, posterior pharynx    Neck:     Neck: Normal appearance, trachea midline     Salivary glands: Normal to palpation bilaterally     Lymph nodes: No cervical lymphadenopathy to palpation     Thyroid: No thyromegaly.  No palpable nodules     Range of motion: Normal    Neurological:     Cortical functions: Alert and oriented x3, appropriate affect       Larynx/hypopharynx:     Laryngeal findings: Mirror exam inadequate or limited secondary to enlarged base of tongue and/or excessive gagging.  Flexible laryngoscopy performed secondary to inadequate mirror examination.  Verbal informed consent the flexible laryngoscope was passed through the nasal cavity.  Normal epiglottis, aryepiglottic folds, arytenoids, false vocal cords, true vocal cords.  Normal vocal cord mobility bilaterally was identified.  No mucosal masses or lesions.  She does have some mild erythema    Nasopharynx:     Nasopharynx: Normal    Ear:     Ear canal: Normal bilaterally     Tympanic membrane: Intact and mobile bilaterally     Pinna: Normal bilaterally     Hearing:  Gross hearing assessment normal by voice    Nose:     Visualized using: Anterior rhinoscopy     Nasopharynx: Inadequate mirror exam secondary to gag, anatomy.       Nasal dorsum: Nontraumatic midline appearance     Septum: Midline     Inferior turbinates: Moderate congestion bilaterally     Mucosa: " Bilateral, pink, normal appearing.  No pus or polyps      Assessment/Plan   Reassured no change in audiogram.  Flexible laryngoscopy also reassuring without lesion.  We discussed antacid therapy.  We discussed over-the-counter nasal steroid spray as a consideration as well.  She was reassured that lesions are seen.  Recheck audiogram in a couple years and may call at any point with change in symptoms       Lalito Wright MD

## 2024-05-15 NOTE — PROGRESS NOTES
"  AUDIOLOGY ADULT AUDIOMETRIC EVALUATION    Name:  Camilo Loo  :  1975  Age:  48 y.o.  Date of Evaluation:  May 15, 2024    Reason for visit: Camilo is seen in the clinic today at the request of otolaryngology for an audiologic evaluation.     HISTORY  Patient reports ringing in mostly her left ear.   She has had this for a few years; last audiograms are  and .    She reports sometimes her right ear will ring as well.   No significant hearing loss reported.  She will occasionally have a fullness sensation in both ears.       EVALUATION  See scanned audiogram: “Media” > “Audiology Report”.      RESULTS  Otoscopic Evaluation:  Right Ear: clear ear canal  Left Ear: clear ear canal    Immittance Measures:  Tympanometry:  Right Ear: Type A, normal tympanic membrane mobility with normal middle ear pressure  Left Ear: Type Ad, hypercompliant tympanic membrane mobility with normal middle ear pressure    Acoustic Reflexes:  Ipsilateral Right Ear: Acoustic reflexes present within normal limits 500Hz through 4KHz   Ipsilateral Left Ear: Acoustic reflexes present within normal limits 500Hz through 4KHz   Contralateral Right Ear: did not evaluate  Contralateral Left Ear: did not evaluate    Distortion Product Otoacoustic Emissions (DPOAEs):  Right Ear: Passed 1KHz; 3KHz-5KHz; Refer at 1500Hz -2KHz; 6KHz-8KHz   Left Ear: Passed 1KHz-2KHz; Refer at 3KHz-8KHz     Audiometry:  Test Technique and Reliability:   Standard audiometry via supra-aural headphones. Reliability is good.    Pure tone air and bone conduction audiometry:  Right Ear: Mild sensorineural hearing loss notch at 4KHz rising to normal at 6KHz-8KHz   Left Ear: Mild sensorineural hearing loss 4KHz-8KHz    Stable     Speech Audiometry (Word Recognition Scores):   Right Ear: Excellent  Left Ear:  Excellent     IMPRESSIONS    Essentially stable mild sensorineural hearing loss 4KHz and above left ear  Mild sensorineural hearing loss \"notch\" at 4KHz right " ear    RECOMMENDATIONS  - Follow up with otolaryngology today as scheduled.  - Annual audiologic evaluation, sooner if an acute change is noted.    PATIENT EDUCATION  Discussed results, impressions and recommendations with the patient. Questions were addressed and the patient was encouraged to contact our office should concerns arise.    Time for this encounter: 910/933  *patient late    Elise Petty M.A., CCC/A   Licensed Audiologist

## 2024-06-11 ENCOUNTER — APPOINTMENT (OUTPATIENT)
Dept: GASTROENTEROLOGY | Facility: CLINIC | Age: 49
End: 2024-06-11
Payer: COMMERCIAL

## 2024-06-17 ENCOUNTER — OFFICE VISIT (OUTPATIENT)
Dept: RHEUMATOLOGY | Facility: CLINIC | Age: 49
End: 2024-06-17
Payer: COMMERCIAL

## 2024-06-17 ENCOUNTER — LAB (OUTPATIENT)
Dept: LAB | Facility: LAB | Age: 49
End: 2024-06-17
Payer: COMMERCIAL

## 2024-06-17 VITALS — SYSTOLIC BLOOD PRESSURE: 108 MMHG | DIASTOLIC BLOOD PRESSURE: 62 MMHG | BODY MASS INDEX: 29.16 KG/M2 | WEIGHT: 162 LBS

## 2024-06-17 DIAGNOSIS — R76.8 ANA POSITIVE: Primary | ICD-10-CM

## 2024-06-17 DIAGNOSIS — G89.29 OTHER CHRONIC PAIN: ICD-10-CM

## 2024-06-17 DIAGNOSIS — R74.8 ELEVATED LIVER ENZYMES: ICD-10-CM

## 2024-06-17 LAB
ALBUMIN SERPL BCP-MCNC: 4.3 G/DL (ref 3.4–5)
ALP SERPL-CCNC: 98 U/L (ref 33–110)
ALT SERPL W P-5'-P-CCNC: 42 U/L (ref 7–45)
AST SERPL W P-5'-P-CCNC: 26 U/L (ref 9–39)
BILIRUB DIRECT SERPL-MCNC: 0.1 MG/DL (ref 0–0.3)
BILIRUB SERPL-MCNC: 0.6 MG/DL (ref 0–1.2)
PROT SERPL-MCNC: 6.9 G/DL (ref 6.4–8.2)

## 2024-06-17 PROCEDURE — 36415 COLL VENOUS BLD VENIPUNCTURE: CPT

## 2024-06-17 PROCEDURE — 80076 HEPATIC FUNCTION PANEL: CPT

## 2024-06-17 PROCEDURE — 99214 OFFICE O/P EST MOD 30 MIN: CPT | Performed by: INTERNAL MEDICINE

## 2024-06-17 RX ORDER — HYDROXYCHLOROQUINE SULFATE 200 MG/1
200 TABLET, FILM COATED ORAL DAILY
Qty: 30 TABLET | Refills: 3 | Status: SHIPPED | OUTPATIENT
Start: 2024-06-17 | End: 2024-12-14

## 2024-06-17 NOTE — PROGRESS NOTES
Recheck  + LAURENCE   Increased dose of duloxetine x 3 months with some relief for muscle pain.    C/O cont pain but improved, swollen glands off and on.     HPI - she is doing the same.  Duloxetine helps some with her chronic pain, but she still gets intermittent pain.  Muscle and joint pain better but still some L knee and L elbow pain.   She gets intermittent feeling like swollen glands in her neck.  She gets itchy rash at base of scalp.  Allergy testing in the past showed that she had a lot of allergies.  Her eyes gets itchy.  She continues to gain weight.  No joint swelling.  AM stiffness/soreness has improved.  She gets pain around her elbows, incr with lifting.  She has trouble swallowing - she has had EGD in the past.  She has not had swallow study.  She sometimes gets severe CP that goes through to her back and lasts for 1/2 hr.  She has had cardiac workup.  No SOB.  She gets intermittent loose stool - she has had colonoscopy and stool studies    PE  NAD  RRR no r/m/g  CTA  No edema  No synovitis    A/P - Poss UCTD with LAURENCE 1:80/RNP 2.4, chronic back pain, and other somatic complaints  She has elevated liver enzymes and is to avoid NSAIDs - duloxetine from pain mgmt helping some  She will go back to GI that she has seen in past for dysphagia for poss swallow study  Trial of hcq - expl risks/benefits/yearly eye exam  Reeval 3 mo or sooner PRN

## 2024-07-01 DIAGNOSIS — R74.8 ELEVATED LIVER ENZYMES: ICD-10-CM

## 2024-09-17 ENCOUNTER — APPOINTMENT (OUTPATIENT)
Dept: RHEUMATOLOGY | Facility: CLINIC | Age: 49
End: 2024-09-17
Payer: COMMERCIAL

## 2024-12-06 ENCOUNTER — LAB (OUTPATIENT)
Dept: LAB | Facility: LAB | Age: 49
End: 2024-12-06
Payer: COMMERCIAL

## 2024-12-06 DIAGNOSIS — I10 ESSENTIAL (PRIMARY) HYPERTENSION: Primary | ICD-10-CM

## 2024-12-06 DIAGNOSIS — E03.9 HYPOTHYROIDISM, UNSPECIFIED: ICD-10-CM

## 2024-12-06 DIAGNOSIS — R74.8 ELEVATED LIVER ENZYMES: ICD-10-CM

## 2024-12-06 LAB
25(OH)D3 SERPL-MCNC: 28 NG/ML (ref 30–100)
ALBUMIN SERPL BCP-MCNC: 4.8 G/DL (ref 3.4–5)
ALP SERPL-CCNC: 120 U/L (ref 33–110)
ALT SERPL W P-5'-P-CCNC: 80 U/L (ref 7–45)
ANION GAP SERPL CALC-SCNC: 13 MMOL/L (ref 10–20)
AST SERPL W P-5'-P-CCNC: 37 U/L (ref 9–39)
BILIRUB DIRECT SERPL-MCNC: 0.1 MG/DL (ref 0–0.3)
BILIRUB SERPL-MCNC: 0.6 MG/DL (ref 0–1.2)
BUN SERPL-MCNC: 15 MG/DL (ref 6–23)
CALCIUM SERPL-MCNC: 9.7 MG/DL (ref 8.6–10.3)
CHLORIDE SERPL-SCNC: 100 MMOL/L (ref 98–107)
CHOLEST SERPL-MCNC: 227 MG/DL (ref 0–199)
CHOLESTEROL/HDL RATIO: 3.8
CO2 SERPL-SCNC: 33 MMOL/L (ref 21–32)
CREAT SERPL-MCNC: 0.84 MG/DL (ref 0.5–1.05)
EGFRCR SERPLBLD CKD-EPI 2021: 85 ML/MIN/1.73M*2
ERYTHROCYTE [DISTWIDTH] IN BLOOD BY AUTOMATED COUNT: 12 % (ref 11.5–14.5)
EST. AVERAGE GLUCOSE BLD GHB EST-MCNC: 105 MG/DL
GLUCOSE SERPL-MCNC: 102 MG/DL (ref 74–99)
HBA1C MFR BLD: 5.3 %
HCT VFR BLD AUTO: 43.1 % (ref 36–46)
HDLC SERPL-MCNC: 60.2 MG/DL
HGB BLD-MCNC: 13.4 G/DL (ref 12–16)
LDLC SERPL CALC-MCNC: 147 MG/DL
MCH RBC QN AUTO: 28.7 PG (ref 26–34)
MCHC RBC AUTO-ENTMCNC: 31.1 G/DL (ref 32–36)
MCV RBC AUTO: 92 FL (ref 80–100)
NON HDL CHOLESTEROL: 167 MG/DL (ref 0–149)
NRBC BLD-RTO: 0 /100 WBCS (ref 0–0)
PLATELET # BLD AUTO: 196 X10*3/UL (ref 150–450)
POTASSIUM SERPL-SCNC: 4.6 MMOL/L (ref 3.5–5.3)
PROT SERPL-MCNC: 7.6 G/DL (ref 6.4–8.2)
RBC # BLD AUTO: 4.67 X10*6/UL (ref 4–5.2)
SODIUM SERPL-SCNC: 141 MMOL/L (ref 136–145)
TRIGL SERPL-MCNC: 97 MG/DL (ref 0–149)
TSH SERPL-ACNC: 0.28 MIU/L (ref 0.44–3.98)
VIT B12 SERPL-MCNC: 341 PG/ML (ref 211–911)
VLDL: 19 MG/DL (ref 0–40)
WBC # BLD AUTO: 5.1 X10*3/UL (ref 4.4–11.3)

## 2024-12-06 PROCEDURE — 80061 LIPID PANEL: CPT

## 2024-12-06 PROCEDURE — 80053 COMPREHEN METABOLIC PANEL: CPT

## 2024-12-06 PROCEDURE — 84443 ASSAY THYROID STIM HORMONE: CPT

## 2024-12-06 PROCEDURE — 85027 COMPLETE CBC AUTOMATED: CPT

## 2024-12-06 PROCEDURE — 82306 VITAMIN D 25 HYDROXY: CPT

## 2024-12-06 PROCEDURE — 82248 BILIRUBIN DIRECT: CPT

## 2024-12-06 PROCEDURE — 36415 COLL VENOUS BLD VENIPUNCTURE: CPT

## 2024-12-06 PROCEDURE — 82607 VITAMIN B-12: CPT

## 2024-12-06 PROCEDURE — 83036 HEMOGLOBIN GLYCOSYLATED A1C: CPT

## 2025-02-17 ENCOUNTER — APPOINTMENT (OUTPATIENT)
Facility: CLINIC | Age: 50
End: 2025-02-17
Payer: COMMERCIAL

## 2025-02-26 ENCOUNTER — APPOINTMENT (OUTPATIENT)
Facility: CLINIC | Age: 50
End: 2025-02-26
Payer: COMMERCIAL

## 2025-02-26 DIAGNOSIS — R13.19 ESOPHAGEAL DYSPHAGIA: Primary | ICD-10-CM

## 2025-02-26 PROCEDURE — 99213 OFFICE O/P EST LOW 20 MIN: CPT | Performed by: STUDENT IN AN ORGANIZED HEALTH CARE EDUCATION/TRAINING PROGRAM

## 2025-02-26 PROCEDURE — 1036F TOBACCO NON-USER: CPT | Performed by: STUDENT IN AN ORGANIZED HEALTH CARE EDUCATION/TRAINING PROGRAM

## 2025-02-26 RX ORDER — PANTOPRAZOLE SODIUM 40 MG/1
40 TABLET, DELAYED RELEASE ORAL DAILY
Qty: 30 TABLET | Refills: 11 | Status: SHIPPED | OUTPATIENT
Start: 2025-02-26 | End: 2026-02-26

## 2025-02-26 ASSESSMENT — ENCOUNTER SYMPTOMS
ABDOMINAL PAIN: 0
FATIGUE: 1
HEADACHES: 1
SORE THROAT: 1
VOMITING: 0
UNEXPECTED WEIGHT CHANGE: 0
FEVER: 0
DIARRHEA: 0
SHORTNESS OF BREATH: 0
ANAL BLEEDING: 0
MYALGIAS: 1
ABDOMINAL DISTENTION: 0
CONSTIPATION: 0
CHILLS: 0
TROUBLE SWALLOWING: 0
CHOKING: 1
NAUSEA: 0
COLOR CHANGE: 0
BLOOD IN STOOL: 0
RECTAL PAIN: 0

## 2025-02-26 NOTE — ASSESSMENT & PLAN NOTE
Chronic intermittent symptoms. Prior EGD for dysphagia/chest pain which was normal per recollection from patient but no report or biopsies to review. Her current dysphagia is different than before and includes oropharyngeal symptoms. Recent diagnosis of undifferentiated connective tissue disorder. Given hoarse voice symptoms could be related to GERD vs motility disorder  - start PPI 40mg every day   - plan for EGD with manometry, patient does not think she can tolerate un sedated probe placement. EGD will allow to reassess for new stricture/esophagitis   - will obtain outside egd with bx report for review

## 2025-02-26 NOTE — PROGRESS NOTES
Chief Complaint:  Chief Complaint   Patient presents with    Dysphagia       Diagnosed with undifferentiated connective tissue disorder, on duloxetine for muscle and joint pain. Her Rheumatologist recommended GI eval for swallow study.     I had previously seen patient in 8/2023 for egd for dysphagia and colonoscopy for screening. Reports not available for review as they were with [facility].     Recently with dysphagia and sensation of food getting stuck. Dysphagia is somewhat different than before. Feels difficulty with initial swallow and that food is slow to go down.    In last 3-4 weeks has choking with drinking and coughing with eat  Hoarse voice   Recent diagnosis of sleep apnea, just started on CPAP. More snoring, feels a flap in back of her throat.    Denies chest burning and regurgitation    Virtual or Telephone Consent    An interactive audio and video telecommunication system which permits real time communications between the patient (at the originating site) and provider (at the distant site) was utilized to provide this telehealth service.   Verbal consent was requested and obtained from Camilo Loo on this date, 02/26/25 for a telehealth visit and the patient's location was confirmed at the time of the visit.                 Review of Systems   Constitutional:  Positive for fatigue. Negative for chills, fever and unexpected weight change.   HENT:  Positive for sore throat. Negative for trouble swallowing.    Respiratory:  Positive for choking. Negative for shortness of breath.    Cardiovascular:  Negative for chest pain.   Gastrointestinal:  Negative for abdominal distention, abdominal pain, anal bleeding, blood in stool, constipation, diarrhea, nausea, rectal pain and vomiting.   Musculoskeletal:  Positive for myalgias.   Skin:  Negative for color change.   Neurological:  Positive for headaches.     No family history on file.    Medications    Current Outpatient Medications:     DULoxetine (Cymbalta) 30  "mg DR capsule, Take 2 capsules (60 mg) by mouth once daily., Disp: , Rfl:     levothyroxine (Synthroid, Levoxyl) 112 mcg tablet, TAKE 1 TABLET BY MOUTH MON-SAT, TAKE 1.5 TABLET ON SUNDAYS, Disp: , Rfl:     Vitals  There were no vitals taken for this visit.    Physical Exam  Constitutional:       General: She is not in acute distress.     Appearance: Normal appearance.   HENT:      Head: Normocephalic.      Nose: Nose normal.   Eyes:      General: No scleral icterus.     Extraocular Movements: Extraocular movements intact.      Conjunctiva/sclera: Conjunctivae normal.   Pulmonary:      Effort: Pulmonary effort is normal.   Abdominal:      General: There is no distension.   Skin:     General: Skin is warm.      Coloration: Skin is not jaundiced.   Neurological:      Mental Status: She is alert.   Psychiatric:         Mood and Affect: Mood normal.           Labs:  No results found for: \"AFP\"  Lab Results   Component Value Date    ASMAB Negative 03/28/2024    MITOAB Negative 03/28/2024     Lab Results   Component Value Date    LAURENCE Positive (A) 03/28/2024     Lab Results   Component Value Date    ASMAB Negative 03/28/2024    MITOAB Negative 03/28/2024     Lab Results   Component Value Date    [id number] 341 12/06/2024     Lab Results   Component Value Date    HEPCAB Nonreactive 03/28/2024     Lab Results   Component Value Date    HEPATOT Nonreactive 03/28/2024    HEPBCAB Nonreactive 03/28/2024    HEPCAB Nonreactive 03/28/2024   No results found for: \"HIV1X2\"  Lab Results   Component Value Date    IRON 109 03/28/2024    TIBC 280 03/28/2024    FERRITIN 109 03/28/2024     Lab Results   Component Value Date    INR 1.0 03/28/2024    PROTIME 10.3 03/28/2024     Lab Results   Component Value Date    TSH 0.28 (L) 12/06/2024       A/P   Camilo was seen today for dysphagia.  Diagnoses and all orders for this visit:  Esophageal dysphagia (Primary)     Problem List Items Addressed This Visit             ICD-10-CM    Esophageal " dysphagia - Primary R13.19     Chronic intermittent symptoms. Prior EGD for dysphagia/chest pain which was normal per recollection from patient but no report or biopsies to review. Her current dysphagia is different than before and includes oropharyngeal symptoms. Recent diagnosis of undifferentiated connective tissue disorder. Given hoarse voice symptoms could be related to GERD vs motility disorder  - start PPI 40mg every day   - plan for EGD with manometry, patient does not think she can tolerate un sedated probe placement. EGD will allow to reassess for new stricture/esophagitis   - will obtain outside egd with bx report for review

## 2025-04-16 ENCOUNTER — APPOINTMENT (OUTPATIENT)
Dept: GASTROENTEROLOGY | Facility: HOSPITAL | Age: 50
End: 2025-04-16
Payer: COMMERCIAL

## 2025-04-16 ENCOUNTER — ANESTHESIA (OUTPATIENT)
Dept: GASTROENTEROLOGY | Facility: HOSPITAL | Age: 50
End: 2025-04-16
Payer: COMMERCIAL

## 2025-04-16 ENCOUNTER — ANESTHESIA EVENT (OUTPATIENT)
Dept: GASTROENTEROLOGY | Facility: HOSPITAL | Age: 50
End: 2025-04-16

## 2025-04-16 NOTE — ANESTHESIA PREPROCEDURE EVALUATION
"Patient: Camilo Loo    Procedure Information       Date/Time: 04/16/25 0830    Scheduled providers: Emi Howell MD; Scott Huggins MD; CHAY Umanzor    Procedure: EGD    Location: [facility]            Relevant Problems   GI   (+) Esophageal dysphagia       Clinical information reviewed:                    Medical History[1]   Surgical History[2]  Social History[3]   Current Outpatient Medications   Medication Instructions    DULoxetine (CYMBALTA) 60 mg, oral, Daily RT    levothyroxine (Synthroid, Levoxyl) 112 mcg tablet TAKE 1 TABLET BY MOUTH MON-SAT, TAKE 1.5 TABLET ON SUNDAYS    pantoprazole (PROTONIX) 40 mg, oral, Daily, Do not crush, chew, or split.      RX Allergies[4]     Chemistry    Lab Results   Component Value Date/Time     12/06/2024 1024    K 4.6 12/06/2024 1024     12/06/2024 1024    CO2 33 (H) 12/06/2024 1024    BUN 15 12/06/2024 1024    CREATININE 0.84 12/06/2024 1024    Lab Results   Component Value Date/Time    CALCIUM 9.7 12/06/2024 1024    ALKPHOS 120 (H) 12/06/2024 1024    AST 37 12/06/2024 1024    ALT 80 (H) 12/06/2024 1024    BILITOT 0.6 12/06/2024 1024          Lab Results   Component Value Date    HGBA1C 5.3 12/06/2024     Lab Results   Component Value Date/Time    WBC 5.1 12/06/2024 1024    HGB 13.4 12/06/2024 1024    HCT 43.1 12/06/2024 1024     12/06/2024 1024     Lab Results   Component Value Date/Time    PROTIME 10.3 03/28/2024 0935    INR 1.0 03/28/2024 0935     No results found for: \"ABORH\"  No results found for this or any previous visit (from the past 4464 hours).  No results found for this or any previous visit from the past 1095 days.       Visit Vitals  Smoking Status Former     No data recorded    PHYSICAL EXAM    Anesthesia Plan         [1]   Past Medical History:  Diagnosis Date    Hypothyroidism    [2]   Past Surgical History:  Procedure Laterality Date    HYSTERECTOMY  10/2016   [3]   Social History  Tobacco Use    Smoking " status: Former     Types: Cigarettes    Smokeless tobacco: Never   Substance Use Topics    Alcohol use: Not Currently    Drug use: Never   [4] No Known Allergies